# Patient Record
Sex: MALE | Race: ASIAN | URBAN - METROPOLITAN AREA
[De-identification: names, ages, dates, MRNs, and addresses within clinical notes are randomized per-mention and may not be internally consistent; named-entity substitution may affect disease eponyms.]

---

## 2019-09-24 ENCOUNTER — APPOINTMENT (OUTPATIENT)
Dept: URBAN - METROPOLITAN AREA SURGERY 9 | Age: 29
Setting detail: DERMATOLOGY
End: 2019-09-24

## 2019-09-24 DIAGNOSIS — L63.8 OTHER ALOPECIA AREATA: ICD-10-CM

## 2019-09-24 PROCEDURE — OTHER PRESCRIPTION: OTHER

## 2019-09-24 PROCEDURE — OTHER OTHER: OTHER

## 2019-09-24 PROCEDURE — OTHER TREATMENT REGIMEN: OTHER

## 2019-09-24 PROCEDURE — OTHER INTRALESIONAL KENALOG: OTHER

## 2019-09-24 PROCEDURE — 11900 INJECT SKIN LESIONS </W 7: CPT

## 2019-09-24 PROCEDURE — OTHER COUNSELING: OTHER

## 2019-09-24 PROCEDURE — OTHER ORDER TESTS: OTHER

## 2019-09-24 RX ORDER — PIMECROLIMUS 10 MG/G
CREAM TOPICAL
Qty: 1 | Refills: 2 | Status: ERX | COMMUNITY
Start: 2019-09-24

## 2019-09-24 RX ORDER — ALCLOMETASONE DIPROPIONATE 0.5 MG/G
CREAM TOPICAL
Qty: 1 | Refills: 2 | Status: ERX | COMMUNITY
Start: 2019-09-24

## 2019-09-24 ASSESSMENT — LOCATION ZONE DERM: LOCATION ZONE: FACE

## 2019-09-24 ASSESSMENT — LOCATION DETAILED DESCRIPTION DERM
LOCATION DETAILED: LEFT INFERIOR PREAURICULAR CHEEK
LOCATION DETAILED: SUBMENTAL CHIN

## 2019-09-24 ASSESSMENT — LOCATION SIMPLE DESCRIPTION DERM
LOCATION SIMPLE: LEFT CHEEK
LOCATION SIMPLE: SUBMENTAL CHIN

## 2019-09-24 NOTE — PROCEDURE: OTHER
Note Text (......Xxx Chief Complaint.): This diagnosis correlates with the
Detail Level: Zone
Other (Free Text): Photo taken 09/24/19.

## 2019-09-24 NOTE — HPI: HAIR LOSS
Additional History: Started as one small patch of hair loss, now has two larger patches. Denies itching.

## 2019-09-24 NOTE — PROCEDURE: TREATMENT REGIMEN
Initiate Treatment: Alclometasone cream alternating with elidel
Plan: Pt to call if Rx is too expensive
Detail Level: Zone

## 2019-10-24 ENCOUNTER — APPOINTMENT (OUTPATIENT)
Dept: URBAN - METROPOLITAN AREA SURGERY 9 | Age: 29
Setting detail: DERMATOLOGY
End: 2019-10-24

## 2019-10-24 DIAGNOSIS — L81.9 DISORDER OF PIGMENTATION, UNSPECIFIED: ICD-10-CM

## 2019-10-24 DIAGNOSIS — L63.8 OTHER ALOPECIA AREATA: ICD-10-CM

## 2019-10-24 PROCEDURE — 99212 OFFICE O/P EST SF 10 MIN: CPT | Mod: 25

## 2019-10-24 PROCEDURE — OTHER OTHER: OTHER

## 2019-10-24 PROCEDURE — OTHER INTRALESIONAL KENALOG: OTHER

## 2019-10-24 PROCEDURE — OTHER COUNSELING: OTHER

## 2019-10-24 PROCEDURE — 11900 INJECT SKIN LESIONS </W 7: CPT

## 2019-10-24 PROCEDURE — OTHER TREATMENT REGIMEN: OTHER

## 2019-10-24 ASSESSMENT — LOCATION DETAILED DESCRIPTION DERM
LOCATION DETAILED: SUBMENTAL CHIN
LOCATION DETAILED: RIGHT ELBOW
LOCATION DETAILED: LEFT INFERIOR PREAURICULAR CHEEK

## 2019-10-24 ASSESSMENT — LOCATION ZONE DERM
LOCATION ZONE: ARM
LOCATION ZONE: FACE

## 2019-10-24 ASSESSMENT — LOCATION SIMPLE DESCRIPTION DERM
LOCATION SIMPLE: SUBMENTAL CHIN
LOCATION SIMPLE: RIGHT ELBOW
LOCATION SIMPLE: LEFT CHEEK

## 2019-10-24 NOTE — PROCEDURE: OTHER
Detail Level: Zone
Other (Free Text): Photo taken
Note Text (......Xxx Chief Complaint.): This diagnosis correlates with the

## 2019-10-24 NOTE — PROCEDURE: COUNSELING
Detail Level: Detailed
Detail Level: Simple
Patient Specific Counseling (Will Not Stick From Patient To Patient): Can trial amlactin if this is more superficial.

## 2019-12-03 ENCOUNTER — APPOINTMENT (OUTPATIENT)
Dept: URBAN - METROPOLITAN AREA SURGERY 9 | Age: 29
Setting detail: DERMATOLOGY
End: 2019-12-03

## 2019-12-03 DIAGNOSIS — L63.8 OTHER ALOPECIA AREATA: ICD-10-CM

## 2019-12-03 PROCEDURE — OTHER INTRALESIONAL KENALOG: OTHER

## 2019-12-03 PROCEDURE — OTHER COUNSELING: OTHER

## 2019-12-03 PROCEDURE — 11900 INJECT SKIN LESIONS </W 7: CPT

## 2019-12-03 PROCEDURE — OTHER TREATMENT REGIMEN: OTHER

## 2019-12-03 ASSESSMENT — LOCATION SIMPLE DESCRIPTION DERM: LOCATION SIMPLE: SUBMENTAL CHIN

## 2019-12-03 ASSESSMENT — LOCATION ZONE DERM: LOCATION ZONE: FACE

## 2019-12-03 ASSESSMENT — LOCATION DETAILED DESCRIPTION DERM: LOCATION DETAILED: SUBMENTAL CHIN

## 2020-02-03 ENCOUNTER — APPOINTMENT (OUTPATIENT)
Dept: URBAN - METROPOLITAN AREA SURGERY 9 | Age: 30
Setting detail: DERMATOLOGY
End: 2020-02-03

## 2020-02-03 DIAGNOSIS — L63.8 OTHER ALOPECIA AREATA: ICD-10-CM

## 2020-02-03 PROCEDURE — 11900 INJECT SKIN LESIONS </W 7: CPT

## 2020-02-03 PROCEDURE — OTHER COUNSELING: OTHER

## 2020-02-03 PROCEDURE — OTHER TREATMENT REGIMEN: OTHER

## 2020-02-03 PROCEDURE — OTHER INTRALESIONAL KENALOG: OTHER

## 2020-02-03 ASSESSMENT — LOCATION DETAILED DESCRIPTION DERM: LOCATION DETAILED: SUBMENTAL CHIN

## 2020-02-03 ASSESSMENT — LOCATION ZONE DERM: LOCATION ZONE: FACE

## 2020-02-03 ASSESSMENT — LOCATION SIMPLE DESCRIPTION DERM: LOCATION SIMPLE: SUBMENTAL CHIN

## 2020-03-04 ENCOUNTER — APPOINTMENT (OUTPATIENT)
Dept: URBAN - METROPOLITAN AREA SURGERY 9 | Age: 30
Setting detail: DERMATOLOGY
End: 2020-03-04

## 2020-03-04 DIAGNOSIS — L63.8 OTHER ALOPECIA AREATA: ICD-10-CM

## 2020-03-04 PROCEDURE — OTHER COUNSELING: OTHER

## 2020-03-04 PROCEDURE — 99212 OFFICE O/P EST SF 10 MIN: CPT

## 2020-03-04 PROCEDURE — OTHER TREATMENT REGIMEN: OTHER

## 2020-03-04 ASSESSMENT — LOCATION SIMPLE DESCRIPTION DERM: LOCATION SIMPLE: SUBMENTAL CHIN

## 2020-03-04 ASSESSMENT — LOCATION ZONE DERM: LOCATION ZONE: FACE

## 2020-03-04 ASSESSMENT — LOCATION DETAILED DESCRIPTION DERM: LOCATION DETAILED: SUBMENTAL CHIN

## 2020-03-04 NOTE — PROCEDURE: TREATMENT REGIMEN
Detail Level: Simple
Plan: Discussed ILK vs continued topicals.  He has made significant progress with injections and currently has hair regrowing in the 3 small areas that are lighter. He will notify office if AA starts progressing and will come in for injections, otherwise he will continue to use his current topical regimen.
Continue Regimen: Alclometasone cream alternating with elidel.